# Patient Record
Sex: MALE | Race: WHITE | Employment: FULL TIME | URBAN - METROPOLITAN AREA
[De-identification: names, ages, dates, MRNs, and addresses within clinical notes are randomized per-mention and may not be internally consistent; named-entity substitution may affect disease eponyms.]

---

## 2020-09-10 ENCOUNTER — HOSPITAL ENCOUNTER (EMERGENCY)
Age: 45
Discharge: HOME | End: 2020-09-10
Payer: COMMERCIAL

## 2020-09-10 VITALS
HEIGHT: 65 IN | BODY MASS INDEX: 19.99 KG/M2 | DIASTOLIC BLOOD PRESSURE: 88 MMHG | HEART RATE: 89 BPM | WEIGHT: 120 LBS | TEMPERATURE: 97.9 F | RESPIRATION RATE: 16 BRPM | OXYGEN SATURATION: 98 % | SYSTOLIC BLOOD PRESSURE: 116 MMHG

## 2020-09-10 PROCEDURE — 2500000003 HC RX 250 WO HCPCS: Performed by: NURSE PRACTITIONER

## 2020-09-10 PROCEDURE — 6360000002 HC RX W HCPCS: Performed by: NURSE PRACTITIONER

## 2020-09-10 PROCEDURE — 6370000000 HC RX 637 (ALT 250 FOR IP): Performed by: NURSE PRACTITIONER

## 2020-09-10 PROCEDURE — 99283 EMERGENCY DEPT VISIT LOW MDM: CPT

## 2020-09-10 PROCEDURE — 99284 EMERGENCY DEPT VISIT MOD MDM: CPT

## 2020-09-10 PROCEDURE — 90471 IMMUNIZATION ADMIN: CPT | Performed by: NURSE PRACTITIONER

## 2020-09-10 PROCEDURE — 12002 RPR S/N/AX/GEN/TRNK2.6-7.5CM: CPT

## 2020-09-10 PROCEDURE — 90715 TDAP VACCINE 7 YRS/> IM: CPT | Performed by: NURSE PRACTITIONER

## 2020-09-10 RX ORDER — AMOXICILLIN AND CLAVULANATE POTASSIUM 875; 125 MG/1; MG/1
1 TABLET, FILM COATED ORAL ONCE
Status: COMPLETED | OUTPATIENT
Start: 2020-09-10 | End: 2020-09-10

## 2020-09-10 RX ORDER — AMOXICILLIN AND CLAVULANATE POTASSIUM 875; 125 MG/1; MG/1
1 TABLET, FILM COATED ORAL 2 TIMES DAILY
Qty: 14 TABLET | Refills: 0 | Status: SHIPPED | OUTPATIENT
Start: 2020-09-10 | End: 2020-09-17

## 2020-09-10 RX ORDER — LIDOCAINE HYDROCHLORIDE 10 MG/ML
20 INJECTION, SOLUTION INFILTRATION; PERINEURAL ONCE
Status: COMPLETED | OUTPATIENT
Start: 2020-09-10 | End: 2020-09-10

## 2020-09-10 RX ORDER — DIAPER,BRIEF,INFANT-TODD,DISP
EACH MISCELLANEOUS ONCE
Status: COMPLETED | OUTPATIENT
Start: 2020-09-10 | End: 2020-09-10

## 2020-09-10 RX ADMIN — LIDOCAINE HYDROCHLORIDE 20 ML: 10 INJECTION, SOLUTION INFILTRATION; PERINEURAL at 22:14

## 2020-09-10 RX ADMIN — AMOXICILLIN AND CLAVULANATE POTASSIUM 1 TABLET: 875; 125 TABLET, FILM COATED ORAL at 22:14

## 2020-09-10 RX ADMIN — TETANUS TOXOID, REDUCED DIPHTHERIA TOXOID AND ACELLULAR PERTUSSIS VACCINE, ADSORBED 0.5 ML: 5; 2.5; 8; 8; 2.5 SUSPENSION INTRAMUSCULAR at 22:14

## 2020-09-10 RX ADMIN — BACITRACIN: 500 OINTMENT TOPICAL at 22:14

## 2020-09-10 ASSESSMENT — PAIN SCALES - GENERAL: PAINLEVEL_OUTOF10: 8

## 2020-09-11 NOTE — ED PROVIDER NOTES
[facility]     Department of Emergency Medicine   ED  Provider Note  Admit Date/RoomTime: 9/10/2020  9:56 PM  ED Room: /  Chief Complaint:   Animal Bite (pt was bit by his dog on his left arm )    History of Present Illness   Source of history provided by:  patient. History/Exam Limitations: none. Chris Ramirez is a 40 y.o. old male presenting to the emergency department by private vehicle, for a dog bite to left forearm, which occured 1 hour(s) prior to arrival.  Since onset the symptoms have been persistent. Tetanus Status:  Unknown. He states that he was bit by his dog at home. Symptoms:    [x]   Pain     []   Redness     []   Pruritis     []   Rash     []   Abscess     []   Swelling     []   Abrasion     []   Puncture     [x]   Laceration              Abnormal Behavior Witnessed:  No.           Geographic Location Where Bitten:  N/A. Immunization Status of Animal:  unknown. ROS    Pertinent positives and negatives are stated within HPI, all other systems reviewed and are negative. Past Medical History:  has no past medical history on file. Past Surgical History:  has no past surgical history on file. Social History:  reports that he has been smoking cigarettes. He has been smoking about 1.00 pack per day. He does not have any smokeless tobacco history on file. He reports that he does not use drugs. Family History: family history is not on file. Allergies: Patient has no known allergies. Physical Exam           ED Triage Vitals [09/10/20 2154]   BP Temp Temp Source Pulse Resp SpO2 Height Weight   116/88 97.9 °F (36.6 °C) Tympanic 89 16 98 % 5' 5\" (1.651 m) 120 lb (54.4 kg)      Oxygen Saturation Interpretation: Normal.    Constitutional:  Alert, development consistent with age. Neck:  Normal ROM. Supple. Extremity(s):  Left: forearm. Tenderness:  mild. Swelling: None. Deformity: No.               ROM: full range of motion. foreign body or tendon injury seen. Debridement: None. Undermining: None. Wound Margins Revised: None. Flaps Aligned: no. The wound was closed loosely with 4-0 Ethilon using interrupted sutures. Dressing:  bacitracin and a sterile dressing was placed. Total number suture:  5. There were no additional lacerations requiring repair. MDM:   Patient presented with a dog bite to his left forearm and abrasion to his right elbow. His wound was thoroughly cleaned, sutured, and dressed. He is initiated on Augmentin and given a tetanus shot. He is appropriate for discharge and outpatient follow-up. He is instructed to return to the emergency department any new or worsening symptoms. Counseling: The emergency provider has spoken with the patient and discussed todays results, in addition to providing specific details for the plan of care and counseling regarding the diagnosis and prognosis. Questions are answered at this time and they are agreeable with the plan. Assessment      1. Dog bite, initial encounter      Plan   Discharge to home  Patient condition is good    New Medications     Discharge Medication List as of 9/10/2020 10:30 PM      START taking these medications    Details   amoxicillin-clavulanate (AUGMENTIN) 875-125 MG per tablet Take 1 tablet by mouth 2 times daily for 7 days, Disp-14 tablet,R-0Print           Electronically signed by NIKKI Meier CNP   DD: 9/10/20  **This report was transcribed using voice recognition software. Every effort was made to ensure accuracy; however, inadvertent computerized transcription errors may be present.   END OF ED PROVIDER NOTE     NIKKI Almodovar CNP  09/10/20 7612

## 2020-09-12 ENCOUNTER — HOSPITAL ENCOUNTER (OUTPATIENT)
Age: 45
Discharge: HOME | End: 2020-09-12
Payer: COMMERCIAL

## 2020-09-12 LAB
ALBUMIN SERPL-MCNC: 4.6 G/DL (ref 3.5–5.2)
ALP BLD-CCNC: 41 U/L (ref 40–129)
ALT SERPL-CCNC: 13 U/L (ref 0–40)
ANION GAP SERPL CALCULATED.3IONS-SCNC: 9 MMOL/L (ref 7–16)
AST SERPL-CCNC: 20 U/L (ref 0–39)
BASOPHILS ABSOLUTE: 0.02 E9/L (ref 0–0.2)
BASOPHILS RELATIVE PERCENT: 0.3 % (ref 0–2)
BILIRUB SERPL-MCNC: 0.8 MG/DL (ref 0–1.2)
BUN BLDV-MCNC: 9 MG/DL (ref 6–20)
CALCIUM SERPL-MCNC: 9.7 MG/DL (ref 8.6–10.2)
CHLORIDE BLD-SCNC: 102 MMOL/L (ref 98–107)
CHOLESTEROL, FASTING: 153 MG/DL (ref 0–199)
CO2: 30 MMOL/L (ref 22–29)
CREAT SERPL-MCNC: 0.9 MG/DL (ref 0.7–1.2)
EOSINOPHILS ABSOLUTE: 0.28 E9/L (ref 0.05–0.5)
EOSINOPHILS RELATIVE PERCENT: 3.9 % (ref 0–6)
GFR AFRICAN AMERICAN: >60
GFR NON-AFRICAN AMERICAN: >60 ML/MIN/1.73
GLUCOSE BLD-MCNC: 84 MG/DL (ref 74–99)
HCT VFR BLD CALC: 49.6 % (ref 37–54)
HDLC SERPL-MCNC: 62 MG/DL
HEMOGLOBIN: 17.2 G/DL (ref 12.5–16.5)
IMMATURE GRANULOCYTES #: 0.04 E9/L
IMMATURE GRANULOCYTES %: 0.6 % (ref 0–5)
LDL CHOLESTEROL CALCULATED: 68 MG/DL (ref 0–99)
LYMPHOCYTES ABSOLUTE: 1.56 E9/L (ref 1.5–4)
LYMPHOCYTES RELATIVE PERCENT: 21.6 % (ref 20–42)
MCH RBC QN AUTO: 33.6 PG (ref 26–35)
MCHC RBC AUTO-ENTMCNC: 34.7 % (ref 32–34.5)
MCV RBC AUTO: 96.9 FL (ref 80–99.9)
MONOCYTES ABSOLUTE: 0.73 E9/L (ref 0.1–0.95)
MONOCYTES RELATIVE PERCENT: 10.1 % (ref 2–12)
NEUTROPHILS ABSOLUTE: 4.6 E9/L (ref 1.8–7.3)
NEUTROPHILS RELATIVE PERCENT: 63.5 % (ref 43–80)
PDW BLD-RTO: 12.7 FL (ref 11.5–15)
PLATELET # BLD: 214 E9/L (ref 130–450)
PMV BLD AUTO: 11.1 FL (ref 7–12)
POTASSIUM SERPL-SCNC: 4.6 MMOL/L (ref 3.5–5)
PROSTATE SPECIFIC ANTIGEN: 1.83 NG/ML (ref 0–4)
RBC # BLD: 5.12 E12/L (ref 3.8–5.8)
SODIUM BLD-SCNC: 141 MMOL/L (ref 132–146)
TOTAL PROTEIN: 7.6 G/DL (ref 6.4–8.3)
TRIGLYCERIDE, FASTING: 116 MG/DL (ref 0–149)
VLDLC SERPL CALC-MCNC: 23 MG/DL
WBC # BLD: 7.2 E9/L (ref 4.5–11.5)

## 2020-09-12 PROCEDURE — 36415 COLL VENOUS BLD VENIPUNCTURE: CPT

## 2020-09-12 PROCEDURE — 85025 COMPLETE CBC W/AUTO DIFF WBC: CPT

## 2020-09-12 PROCEDURE — 80061 LIPID PANEL: CPT

## 2020-09-12 PROCEDURE — 80053 COMPREHEN METABOLIC PANEL: CPT

## 2020-09-12 PROCEDURE — G0103 PSA SCREENING: HCPCS

## 2022-09-16 ENCOUNTER — APPOINTMENT (OUTPATIENT)
Dept: GENERAL RADIOLOGY | Age: 47
DRG: 313 | End: 2022-09-16
Payer: COMMERCIAL

## 2022-09-16 ENCOUNTER — HOSPITAL ENCOUNTER (INPATIENT)
Age: 47
LOS: 1 days | Discharge: HOME | DRG: 313 | End: 2022-09-18
Attending: EMERGENCY MEDICINE | Admitting: INTERNAL MEDICINE
Payer: COMMERCIAL

## 2022-09-16 DIAGNOSIS — I21.4 NON-STEMI (NON-ST ELEVATED MYOCARDIAL INFARCTION) (HCC): Primary | ICD-10-CM

## 2022-09-16 LAB
ALBUMIN SERPL-MCNC: 4.3 G/DL (ref 3.5–5.2)
ALP BLD-CCNC: 42 U/L (ref 40–129)
ALT SERPL-CCNC: 19 U/L (ref 0–40)
ANION GAP SERPL CALCULATED.3IONS-SCNC: 10 MMOL/L (ref 7–16)
AST SERPL-CCNC: 22 U/L (ref 0–39)
BASOPHILS ABSOLUTE: 0.03 E9/L (ref 0–0.2)
BASOPHILS RELATIVE PERCENT: 0.5 % (ref 0–2)
BILIRUB SERPL-MCNC: 0.2 MG/DL (ref 0–1.2)
BUN BLDV-MCNC: 13 MG/DL (ref 6–20)
CALCIUM SERPL-MCNC: 9.4 MG/DL (ref 8.6–10.2)
CHLORIDE BLD-SCNC: 101 MMOL/L (ref 98–107)
CO2: 27 MMOL/L (ref 22–29)
CREAT SERPL-MCNC: 0.9 MG/DL (ref 0.7–1.2)
EOSINOPHILS ABSOLUTE: 0.18 E9/L (ref 0.05–0.5)
EOSINOPHILS RELATIVE PERCENT: 3.3 % (ref 0–6)
GFR AFRICAN AMERICAN: >60
GFR NON-AFRICAN AMERICAN: >60 ML/MIN/1.73
GLUCOSE BLD-MCNC: 136 MG/DL (ref 74–99)
HCT VFR BLD CALC: 42.9 % (ref 37–54)
HEMOGLOBIN: 14.7 G/DL (ref 12.5–16.5)
IMMATURE GRANULOCYTES #: 0.02 E9/L
IMMATURE GRANULOCYTES %: 0.4 % (ref 0–5)
LYMPHOCYTES ABSOLUTE: 1.7 E9/L (ref 1.5–4)
LYMPHOCYTES RELATIVE PERCENT: 30.7 % (ref 20–42)
MCH RBC QN AUTO: 32.4 PG (ref 26–35)
MCHC RBC AUTO-ENTMCNC: 34.3 % (ref 32–34.5)
MCV RBC AUTO: 94.5 FL (ref 80–99.9)
MONOCYTES ABSOLUTE: 0.77 E9/L (ref 0.1–0.95)
MONOCYTES RELATIVE PERCENT: 13.9 % (ref 2–12)
NEUTROPHILS ABSOLUTE: 2.83 E9/L (ref 1.8–7.3)
NEUTROPHILS RELATIVE PERCENT: 51.2 % (ref 43–80)
PDW BLD-RTO: 12.7 FL (ref 11.5–15)
PLATELET # BLD: 210 E9/L (ref 130–450)
PMV BLD AUTO: 11.3 FL (ref 7–12)
POTASSIUM SERPL-SCNC: 4.4 MMOL/L (ref 3.5–5)
RBC # BLD: 4.54 E12/L (ref 3.8–5.8)
SODIUM BLD-SCNC: 138 MMOL/L (ref 132–146)
TOTAL PROTEIN: 6.9 G/DL (ref 6.4–8.3)
TROPONIN, HIGH SENSITIVITY: 122 NG/L (ref 0–11)
WBC # BLD: 5.5 E9/L (ref 4.5–11.5)

## 2022-09-16 PROCEDURE — 80053 COMPREHEN METABOLIC PANEL: CPT

## 2022-09-16 PROCEDURE — 6370000000 HC RX 637 (ALT 250 FOR IP): Performed by: NURSE PRACTITIONER

## 2022-09-16 PROCEDURE — 84484 ASSAY OF TROPONIN QUANT: CPT

## 2022-09-16 PROCEDURE — 80307 DRUG TEST PRSMV CHEM ANLYZR: CPT

## 2022-09-16 PROCEDURE — 99285 EMERGENCY DEPT VISIT HI MDM: CPT

## 2022-09-16 PROCEDURE — 93005 ELECTROCARDIOGRAM TRACING: CPT | Performed by: NURSE PRACTITIONER

## 2022-09-16 PROCEDURE — 85025 COMPLETE CBC W/AUTO DIFF WBC: CPT

## 2022-09-16 PROCEDURE — 71045 X-RAY EXAM CHEST 1 VIEW: CPT

## 2022-09-16 RX ORDER — ASPIRIN 81 MG/1
81 TABLET, CHEWABLE ORAL DAILY
COMMUNITY

## 2022-09-16 RX ORDER — ASPIRIN 81 MG/1
243 TABLET, CHEWABLE ORAL ONCE
Status: COMPLETED | OUTPATIENT
Start: 2022-09-16 | End: 2022-09-16

## 2022-09-16 RX ORDER — CLOPIDOGREL BISULFATE 75 MG/1
75 TABLET ORAL DAILY
COMMUNITY

## 2022-09-16 RX ORDER — BUPROPION HYDROCHLORIDE 150 MG/1
150 TABLET ORAL EVERY MORNING
COMMUNITY

## 2022-09-16 RX ORDER — ATORVASTATIN CALCIUM 20 MG/1
20 TABLET, FILM COATED ORAL DAILY
Status: ON HOLD | COMMUNITY
End: 2022-09-18 | Stop reason: HOSPADM

## 2022-09-16 RX ADMIN — ASPIRIN 81 MG 243 MG: 81 TABLET ORAL at 21:14

## 2022-09-16 ASSESSMENT — PAIN - FUNCTIONAL ASSESSMENT: PAIN_FUNCTIONAL_ASSESSMENT: NONE - DENIES PAIN

## 2022-09-17 PROBLEM — I21.4 NSTEMI (NON-ST ELEVATED MYOCARDIAL INFARCTION) (HCC): Status: ACTIVE | Noted: 2022-09-17

## 2022-09-17 LAB
ALBUMIN SERPL-MCNC: 4.3 G/DL (ref 3.5–5.2)
ALBUMIN SERPL-MCNC: 4.5 G/DL (ref 3.5–5.2)
ALP BLD-CCNC: 37 U/L (ref 40–129)
ALP BLD-CCNC: 38 U/L (ref 40–129)
ALT SERPL-CCNC: 21 U/L (ref 0–40)
ALT SERPL-CCNC: 23 U/L (ref 0–40)
AMPHETAMINE SCREEN, URINE: NOT DETECTED
ANION GAP SERPL CALCULATED.3IONS-SCNC: 11 MMOL/L (ref 7–16)
ANION GAP SERPL CALCULATED.3IONS-SCNC: 8 MMOL/L (ref 7–16)
APTT: 29 SEC (ref 24.5–35.1)
APTT: 32.5 SEC (ref 24.5–35.1)
APTT: 32.5 SEC (ref 24.5–35.1)
AST SERPL-CCNC: 41 U/L (ref 0–39)
AST SERPL-CCNC: 48 U/L (ref 0–39)
BARBITURATE SCREEN URINE: NOT DETECTED
BASOPHILS ABSOLUTE: 0.01 E9/L (ref 0–0.2)
BASOPHILS RELATIVE PERCENT: 0.2 % (ref 0–2)
BENZODIAZEPINE SCREEN, URINE: NOT DETECTED
BILIRUB SERPL-MCNC: 0.5 MG/DL (ref 0–1.2)
BILIRUB SERPL-MCNC: 0.5 MG/DL (ref 0–1.2)
BILIRUBIN DIRECT: <0.2 MG/DL (ref 0–0.3)
BILIRUBIN, INDIRECT: ABNORMAL MG/DL (ref 0–1)
BUN BLDV-MCNC: 10 MG/DL (ref 6–20)
BUN BLDV-MCNC: 11 MG/DL (ref 6–20)
CALCIUM SERPL-MCNC: 9 MG/DL (ref 8.6–10.2)
CALCIUM SERPL-MCNC: 9.1 MG/DL (ref 8.6–10.2)
CANNABINOID SCREEN URINE: NOT DETECTED
CHLORIDE BLD-SCNC: 102 MMOL/L (ref 98–107)
CHLORIDE BLD-SCNC: 104 MMOL/L (ref 98–107)
CHOLESTEROL, TOTAL: 179 MG/DL (ref 0–199)
CO2: 24 MMOL/L (ref 22–29)
CO2: 26 MMOL/L (ref 22–29)
COCAINE METABOLITE SCREEN URINE: NOT DETECTED
CREAT SERPL-MCNC: 0.8 MG/DL (ref 0.7–1.2)
CREAT SERPL-MCNC: 0.8 MG/DL (ref 0.7–1.2)
EOSINOPHILS ABSOLUTE: 0.14 E9/L (ref 0.05–0.5)
EOSINOPHILS RELATIVE PERCENT: 2.5 % (ref 0–6)
FENTANYL SCREEN, URINE: NOT DETECTED
GFR AFRICAN AMERICAN: >60
GFR AFRICAN AMERICAN: >60
GFR NON-AFRICAN AMERICAN: >60 ML/MIN/1.73
GFR NON-AFRICAN AMERICAN: >60 ML/MIN/1.73
GLUCOSE BLD-MCNC: 105 MG/DL (ref 74–99)
GLUCOSE BLD-MCNC: 108 MG/DL (ref 74–99)
HBA1C MFR BLD: 4.9 % (ref 4–5.6)
HCT VFR BLD CALC: 43.3 % (ref 37–54)
HCT VFR BLD CALC: 44 % (ref 37–54)
HDLC SERPL-MCNC: 55 MG/DL
HEMOGLOBIN: 14.8 G/DL (ref 12.5–16.5)
HEMOGLOBIN: 15.3 G/DL (ref 12.5–16.5)
IMMATURE GRANULOCYTES #: 0.01 E9/L
IMMATURE GRANULOCYTES %: 0.2 % (ref 0–5)
LDL CHOLESTEROL CALCULATED: 102 MG/DL (ref 0–99)
LYMPHOCYTES ABSOLUTE: 1.54 E9/L (ref 1.5–4)
LYMPHOCYTES RELATIVE PERCENT: 28 % (ref 20–42)
Lab: NORMAL
MCH RBC QN AUTO: 32.3 PG (ref 26–35)
MCH RBC QN AUTO: 32.3 PG (ref 26–35)
MCHC RBC AUTO-ENTMCNC: 34.2 % (ref 32–34.5)
MCHC RBC AUTO-ENTMCNC: 34.8 % (ref 32–34.5)
MCV RBC AUTO: 93 FL (ref 80–99.9)
MCV RBC AUTO: 94.5 FL (ref 80–99.9)
METHADONE SCREEN, URINE: NOT DETECTED
MONOCYTES ABSOLUTE: 0.72 E9/L (ref 0.1–0.95)
MONOCYTES RELATIVE PERCENT: 13.1 % (ref 2–12)
NEUTROPHILS ABSOLUTE: 3.08 E9/L (ref 1.8–7.3)
NEUTROPHILS RELATIVE PERCENT: 56 % (ref 43–80)
OPIATE SCREEN URINE: NOT DETECTED
OXYCODONE URINE: NOT DETECTED
PDW BLD-RTO: 12.6 FL (ref 11.5–15)
PDW BLD-RTO: 12.8 FL (ref 11.5–15)
PHENCYCLIDINE SCREEN URINE: NOT DETECTED
PLATELET # BLD: 220 E9/L (ref 130–450)
PLATELET # BLD: 221 E9/L (ref 130–450)
PMV BLD AUTO: 10.8 FL (ref 7–12)
PMV BLD AUTO: 11.7 FL (ref 7–12)
POTASSIUM REFLEX MAGNESIUM: 4.8 MMOL/L (ref 3.5–5)
POTASSIUM SERPL-SCNC: 4.4 MMOL/L (ref 3.5–5)
RBC # BLD: 4.58 E12/L (ref 3.8–5.8)
RBC # BLD: 4.73 E12/L (ref 3.8–5.8)
REASON FOR REJECTION: NORMAL
REJECTED TEST: NORMAL
SODIUM BLD-SCNC: 136 MMOL/L (ref 132–146)
SODIUM BLD-SCNC: 139 MMOL/L (ref 132–146)
TOTAL PROTEIN: 6.7 G/DL (ref 6.4–8.3)
TOTAL PROTEIN: 7 G/DL (ref 6.4–8.3)
TRIGL SERPL-MCNC: 110 MG/DL (ref 0–149)
TROPONIN, HIGH SENSITIVITY: 421 NG/L (ref 0–11)
VLDLC SERPL CALC-MCNC: 22 MG/DL
WBC # BLD: 5.4 E9/L (ref 4.5–11.5)
WBC # BLD: 5.5 E9/L (ref 4.5–11.5)

## 2022-09-17 PROCEDURE — 85730 THROMBOPLASTIN TIME PARTIAL: CPT

## 2022-09-17 PROCEDURE — 6370000000 HC RX 637 (ALT 250 FOR IP): Performed by: CLINICAL NURSE SPECIALIST

## 2022-09-17 PROCEDURE — 6360000002 HC RX W HCPCS: Performed by: EMERGENCY MEDICINE

## 2022-09-17 PROCEDURE — 93005 ELECTROCARDIOGRAM TRACING: CPT | Performed by: NURSE PRACTITIONER

## 2022-09-17 PROCEDURE — 6360000002 HC RX W HCPCS: Performed by: NURSE PRACTITIONER

## 2022-09-17 PROCEDURE — 2060000000 HC ICU INTERMEDIATE R&B

## 2022-09-17 PROCEDURE — 85027 COMPLETE CBC AUTOMATED: CPT

## 2022-09-17 PROCEDURE — 83036 HEMOGLOBIN GLYCOSYLATED A1C: CPT

## 2022-09-17 PROCEDURE — 6370000000 HC RX 637 (ALT 250 FOR IP): Performed by: INTERNAL MEDICINE

## 2022-09-17 PROCEDURE — 80048 BASIC METABOLIC PNL TOTAL CA: CPT

## 2022-09-17 PROCEDURE — 2580000003 HC RX 258: Performed by: NURSE PRACTITIONER

## 2022-09-17 PROCEDURE — 80061 LIPID PANEL: CPT

## 2022-09-17 PROCEDURE — 2580000003 HC RX 258: Performed by: INTERNAL MEDICINE

## 2022-09-17 PROCEDURE — 80076 HEPATIC FUNCTION PANEL: CPT

## 2022-09-17 PROCEDURE — APPSS60 APP SPLIT SHARED TIME 46-60 MINUTES: Performed by: CLINICAL NURSE SPECIALIST

## 2022-09-17 PROCEDURE — 80053 COMPREHEN METABOLIC PANEL: CPT

## 2022-09-17 PROCEDURE — 85025 COMPLETE CBC W/AUTO DIFF WBC: CPT

## 2022-09-17 PROCEDURE — 99254 IP/OBS CNSLTJ NEW/EST MOD 60: CPT | Performed by: INTERNAL MEDICINE

## 2022-09-17 PROCEDURE — 36415 COLL VENOUS BLD VENIPUNCTURE: CPT

## 2022-09-17 RX ORDER — NICOTINE 21 MG/24HR
1 PATCH, TRANSDERMAL 24 HOURS TRANSDERMAL DAILY
Status: DISCONTINUED | OUTPATIENT
Start: 2022-09-17 | End: 2022-09-19 | Stop reason: HOSPADM

## 2022-09-17 RX ORDER — ROSUVASTATIN CALCIUM 20 MG/1
20 TABLET, COATED ORAL NIGHTLY
Status: DISCONTINUED | OUTPATIENT
Start: 2022-09-17 | End: 2022-09-19 | Stop reason: HOSPADM

## 2022-09-17 RX ORDER — LORAZEPAM 1 MG/1
2 TABLET ORAL
Status: DISCONTINUED | OUTPATIENT
Start: 2022-09-17 | End: 2022-09-19 | Stop reason: HOSPADM

## 2022-09-17 RX ORDER — HEPARIN SODIUM 1000 [USP'U]/ML
30 INJECTION, SOLUTION INTRAVENOUS; SUBCUTANEOUS PRN
Status: DISCONTINUED | OUTPATIENT
Start: 2022-09-17 | End: 2022-09-18 | Stop reason: ALTCHOICE

## 2022-09-17 RX ORDER — ACETAMINOPHEN 650 MG/1
650 SUPPOSITORY RECTAL EVERY 6 HOURS PRN
Status: DISCONTINUED | OUTPATIENT
Start: 2022-09-17 | End: 2022-09-19 | Stop reason: HOSPADM

## 2022-09-17 RX ORDER — LORAZEPAM 2 MG/ML
2 INJECTION INTRAMUSCULAR
Status: DISCONTINUED | OUTPATIENT
Start: 2022-09-17 | End: 2022-09-19 | Stop reason: HOSPADM

## 2022-09-17 RX ORDER — SODIUM CHLORIDE 9 MG/ML
INJECTION, SOLUTION INTRAVENOUS PRN
Status: DISCONTINUED | OUTPATIENT
Start: 2022-09-17 | End: 2022-09-19 | Stop reason: HOSPADM

## 2022-09-17 RX ORDER — LORAZEPAM 1 MG/1
4 TABLET ORAL
Status: DISCONTINUED | OUTPATIENT
Start: 2022-09-17 | End: 2022-09-19 | Stop reason: HOSPADM

## 2022-09-17 RX ORDER — SODIUM CHLORIDE 0.9 % (FLUSH) 0.9 %
5-40 SYRINGE (ML) INJECTION EVERY 12 HOURS SCHEDULED
Status: DISCONTINUED | OUTPATIENT
Start: 2022-09-17 | End: 2022-09-19 | Stop reason: HOSPADM

## 2022-09-17 RX ORDER — ATORVASTATIN CALCIUM 20 MG/1
20 TABLET, FILM COATED ORAL DAILY
Status: DISCONTINUED | OUTPATIENT
Start: 2022-09-17 | End: 2022-09-17

## 2022-09-17 RX ORDER — ACETAMINOPHEN 325 MG/1
650 TABLET ORAL EVERY 6 HOURS PRN
Status: DISCONTINUED | OUTPATIENT
Start: 2022-09-17 | End: 2022-09-19 | Stop reason: HOSPADM

## 2022-09-17 RX ORDER — SODIUM CHLORIDE 0.9 % (FLUSH) 0.9 %
5-40 SYRINGE (ML) INJECTION PRN
Status: DISCONTINUED | OUTPATIENT
Start: 2022-09-17 | End: 2022-09-19 | Stop reason: HOSPADM

## 2022-09-17 RX ORDER — ASPIRIN 81 MG/1
81 TABLET, CHEWABLE ORAL DAILY
Status: DISCONTINUED | OUTPATIENT
Start: 2022-09-17 | End: 2022-09-19 | Stop reason: HOSPADM

## 2022-09-17 RX ORDER — HEPARIN SODIUM 1000 [USP'U]/ML
60 INJECTION, SOLUTION INTRAVENOUS; SUBCUTANEOUS PRN
Status: DISCONTINUED | OUTPATIENT
Start: 2022-09-17 | End: 2022-09-18 | Stop reason: ALTCHOICE

## 2022-09-17 RX ORDER — LORAZEPAM 2 MG/ML
1 INJECTION INTRAMUSCULAR
Status: DISCONTINUED | OUTPATIENT
Start: 2022-09-17 | End: 2022-09-19 | Stop reason: HOSPADM

## 2022-09-17 RX ORDER — SENNA PLUS 8.6 MG/1
1 TABLET ORAL DAILY PRN
Status: DISCONTINUED | OUTPATIENT
Start: 2022-09-17 | End: 2022-09-19 | Stop reason: HOSPADM

## 2022-09-17 RX ORDER — ONDANSETRON 4 MG/1
4 TABLET, ORALLY DISINTEGRATING ORAL EVERY 8 HOURS PRN
Status: DISCONTINUED | OUTPATIENT
Start: 2022-09-17 | End: 2022-09-19 | Stop reason: HOSPADM

## 2022-09-17 RX ORDER — LORAZEPAM 1 MG/1
3 TABLET ORAL
Status: DISCONTINUED | OUTPATIENT
Start: 2022-09-17 | End: 2022-09-19 | Stop reason: HOSPADM

## 2022-09-17 RX ORDER — ONDANSETRON 2 MG/ML
4 INJECTION INTRAMUSCULAR; INTRAVENOUS EVERY 6 HOURS PRN
Status: DISCONTINUED | OUTPATIENT
Start: 2022-09-17 | End: 2022-09-19 | Stop reason: HOSPADM

## 2022-09-17 RX ORDER — CLOPIDOGREL BISULFATE 75 MG/1
75 TABLET ORAL DAILY
Status: DISCONTINUED | OUTPATIENT
Start: 2022-09-17 | End: 2022-09-19 | Stop reason: HOSPADM

## 2022-09-17 RX ORDER — HEPARIN SODIUM 10000 [USP'U]/100ML
5-30 INJECTION, SOLUTION INTRAVENOUS CONTINUOUS
Status: DISCONTINUED | OUTPATIENT
Start: 2022-09-17 | End: 2022-09-18

## 2022-09-17 RX ORDER — HEPARIN SODIUM 1000 [USP'U]/ML
60 INJECTION, SOLUTION INTRAVENOUS; SUBCUTANEOUS ONCE
Status: COMPLETED | OUTPATIENT
Start: 2022-09-17 | End: 2022-09-17

## 2022-09-17 RX ORDER — LORAZEPAM 2 MG/ML
4 INJECTION INTRAMUSCULAR
Status: DISCONTINUED | OUTPATIENT
Start: 2022-09-17 | End: 2022-09-19 | Stop reason: HOSPADM

## 2022-09-17 RX ORDER — BUPROPION HYDROCHLORIDE 150 MG/1
150 TABLET ORAL EVERY MORNING
Status: DISCONTINUED | OUTPATIENT
Start: 2022-09-17 | End: 2022-09-19 | Stop reason: HOSPADM

## 2022-09-17 RX ORDER — LORAZEPAM 1 MG/1
1 TABLET ORAL
Status: DISCONTINUED | OUTPATIENT
Start: 2022-09-17 | End: 2022-09-19 | Stop reason: HOSPADM

## 2022-09-17 RX ORDER — LORAZEPAM 2 MG/ML
3 INJECTION INTRAMUSCULAR
Status: DISCONTINUED | OUTPATIENT
Start: 2022-09-17 | End: 2022-09-19 | Stop reason: HOSPADM

## 2022-09-17 RX ADMIN — Medication 10 ML: at 09:00

## 2022-09-17 RX ADMIN — Medication 12 UNITS/KG/HR: at 02:01

## 2022-09-17 RX ADMIN — HEPARIN SODIUM 3540 UNITS: 1000 INJECTION INTRAVENOUS; SUBCUTANEOUS at 19:09

## 2022-09-17 RX ADMIN — ROSUVASTATIN CALCIUM 20 MG: 20 TABLET, FILM COATED ORAL at 21:17

## 2022-09-17 RX ADMIN — SODIUM CHLORIDE, PRESERVATIVE FREE 10 ML: 5 INJECTION INTRAVENOUS at 21:17

## 2022-09-17 RX ADMIN — HEPARIN SODIUM 1770 UNITS: 1000 INJECTION INTRAVENOUS; SUBCUTANEOUS at 10:08

## 2022-09-17 RX ADMIN — HEPARIN SODIUM 3540 UNITS: 1000 INJECTION INTRAVENOUS; SUBCUTANEOUS at 01:55

## 2022-09-17 ASSESSMENT — LIFESTYLE VARIABLES
HOW MANY STANDARD DRINKS CONTAINING ALCOHOL DO YOU HAVE ON A TYPICAL DAY: 5 OR 6
HOW OFTEN DO YOU HAVE A DRINK CONTAINING ALCOHOL: 4 OR MORE TIMES A WEEK

## 2022-09-17 NOTE — CONSULTS
I independently interviewed and examined the patient. I have reviewed the above documentation completed by the OAN[facility]. Please see my additional contributions to the HPI, physical exam, and assessment / medical decision making. Reason for consult[de-identified] NSTEMI    Not previously known to [facility]) cardiology    Mr. Sameer Purdy is a 71-year-old  male with history of cocaine abuse which she quit in August 2022, alcohol abuse, marijuana use, chronic smoker with 34-pack-year history of smoking currently smoking half pack per day presented to hospital with chest pain. He was recently hospitalized at  from 8/3/2020 to 8/5/2022 with chest pain and positive troponin in setting of cocaine toxicity. Patient with underwent cardiac catheterization and was told no obstructive CAD. Patient was discharged home on aspirin Plavix and Lipitor. He was tried on beta-blocker which was subsequently discontinued due to bradycardia. Last night patient developed tightness in his throat with discomfort over bilateral infra axillary area that lasted about 30 to 35 minutes. He took Prevacid with improvement in his symptoms and pain ultimately resolved after coming to the hospital.  Patient remained pain-free after coming to the hospital.  Currently denies any chest pain, throat pain, dyspnea, palpitations, dizziness or lightheadedness. ER work-up showed troponin of 122 on 421. Creatinine was normal.  EKG normal sinus rhythm, LVH with T wave changes suggestive of inferior wall ischemia. Chest x-ray was normal.  Patient was given aspirin and was initiated on heparin. Urine drug screen was negative.   No family history of premature CAD    Review of Systems:  Cardiac: As per HPI  General: No fever, chills  Pulmonary: As per HPI  GI: No nausea, vomiting  Musculoskeletal: PETERSON x 4, no focal motor deficits  Skin: Intact, no rashes  Neuro/Psych: No headache or seizures    Physical Exam:  /74   Pulse 71   Temp 97.9 °F (36.6 °C) (Oral)   Resp 16   Ht 5' 6\" (1.676 m)   Wt 130 lb (59 kg)   SpO2 97%   BMI 20.98 kg/m²   Appearance: Awake, alert, no acute respiratory distress  Skin: Intact, no rash  Head: Normocephalic, atraumatic  ENMT: MMM, no rhinorrhea  Neck: Supple, no carotid bruits  Lungs: Clear to auscultation bilaterally. No wheezes, rales, or rhonchi. Cardiac: Regular rate and rhythm, +S1S2, no murmurs apparent  Abdomen: Soft, +bowel sounds  Extremities: Moves all extremities x 4, no lower extremity edema  Neurologic: No focal motor deficits apparent, normal mood and affect    Telemetry findings reviewed: SR at rate 70s    EKG: Normal sinus rhythm, LVH, T wave changes suggestive of inferior wall ischemia    Vitals and labs were reviewed: Blood pressure 116/74 heart rate 71 sats 94% on room air    Labs-BMP normal, troponin, high sensitivity 122>> 421, cholesterol 179, HDL 50, , triglyceride 110, AST/ALT 41/21, A1c 4.9, urine drug screen negative, CBC normal.    Chest x-ray NAD    Assessment:  Atypical chest pain with positive troponin and significant delta. Recent history of NSTEMI in the setting of cocaine toxicity, cardiac cath at [facility] in August 2022 was normal  History of polysubstance abuse quit in August 2022  Tobacco use disorder  History of alcohol abuse        Plan:   Continue aspirin, Plavix and IV heparin  Will change atorvastatin to rosuvastatin 20 mg p.o. daily  Obtain medical records including cardiac cath results from [facility]  Will obtain an echocardiogram to assess LV function  Add low-dose beta-blocker therapy with Toprol-XL 12.5 mg p.o. daily  Patient was advised again to stop smoking, drinking and also using any illicit drugs. No ischemic work-up is planned at this time and he can eat. Further recommendations pending above      Thank you for consulting allowing us to participate in Mr. Nelson's care. Jackie Faye MD, [address]   [facility]) Cardiology

## 2022-09-17 NOTE — ED NOTES
Department of Emergency Medicine  FIRST PROVIDER TRIAGE NOTE             [facility]dependent [facility]           9/16/22  9:01 PM EDT    Date of Encounter: 9/16/22   MRN: [id number]      HPI: Jessie Wang is a 52 y.o. male who presents to the ED for Dizziness (Lightheaded clammy and nauseated started one hour ago. Has calmed down a little. Hx of MI a month ago. )        ROS: Negative for back pain or fever. PE: Gen Appearance/Constitutional: alert  CV: regular rate     Initial Plan of Care: All treatment areas with department are currently occupied. Plan to order/Initiate the following while awaiting opening in ED: labs, EKG, and imaging studies.   Initiate Treatment-Testing, Proceed toTreatment Area When Bed Available for ED Attending/LIN to Continue Care    Electronically signed by NIKKI Glynn CNP   DD: 9/16/22       NIKKI Glynn CNP  09/16/22 2382    ATTENDING PROVIDER ATTESTATION:     Supervising Physician, on-site, available for consultation, non-participatory in the evaluation or care of this patient         Elliott Diaz MD  09/16/22 5916

## 2022-09-17 NOTE — ED PROVIDER NOTES
auscultation bilaterally, no wheezes, rales, or rhonchi. Not in respiratory distress  Cardiovascular:  Regular rate. Regular rhythm. No murmurs, gallops, or rubs. 2+ distal pulses  Chest: no chest wall tenderness  Abdomen: Soft. Non tender. Non distended. +BS. No rebound, guarding, or rigidity. No pulsatile masses appreciated. Musculoskeletal: Moves all extremities x 4. Warm and well perfused, no clubbing, cyanosis, or edema. Capillary refill <3 seconds  Skin: warm and dry. No rashes. Neurologic: GCS 15, CN 2-12 grossly intact, no focal deficits, symmetric strength 5/5 in the upper and lower extremities bilaterally  Psych: Normal Affect    -------------------------------------------------- RESULTS -------------------------------------------------  I have personally reviewed all laboratory and imaging results for this patient. Results are listed below.      LABS:  Results for orders placed or performed during the hospital encounter of 09/16/22   Troponin   Result Value Ref Range    Troponin, High Sensitivity 122 (H) 0 - 11 ng/L   CBC with Auto Differential   Result Value Ref Range    WBC 5.5 4.5 - 11.5 E9/L    RBC 4.54 3.80 - 5.80 E12/L    Hemoglobin 14.7 12.5 - 16.5 g/dL    Hematocrit 42.9 37.0 - 54.0 %    MCV 94.5 80.0 - 99.9 fL    MCH 32.4 26.0 - 35.0 pg    MCHC 34.3 32.0 - 34.5 %    RDW 12.7 11.5 - 15.0 fL    Platelets 661 258 - 566 E9/L    MPV 11.3 7.0 - 12.0 fL    Neutrophils % 51.2 43.0 - 80.0 %    Immature Granulocytes % 0.4 0.0 - 5.0 %    Lymphocytes % 30.7 20.0 - 42.0 %    Monocytes % 13.9 (H) 2.0 - 12.0 %    Eosinophils % 3.3 0.0 - 6.0 %    Basophils % 0.5 0.0 - 2.0 %    Neutrophils Absolute 2.83 1.80 - 7.30 E9/L    Immature Granulocytes # 0.02 E9/L    Lymphocytes Absolute 1.70 1.50 - 4.00 E9/L    Monocytes Absolute 0.77 0.10 - 0.95 E9/L    Eosinophils Absolute 0.18 0.05 - 0.50 E9/L    Basophils Absolute 0.03 0.00 - 0.20 E9/L   Comprehensive Metabolic Panel   Result Value Ref Range    Sodium 138 132 - administration in time range)   heparin (porcine) injection 1,770 Units (has no administration in time range)   heparin 25,000 units in dextrose 5% 250 mL (premix) infusion (has no administration in time range)   aspirin chewable tablet 243 mg (243 mg Oral Given 9/16/22 2114)             Medical Decision Making:   Patient presenting here because of history of tightness in throat as well as being diaphoretic. Patient reports it started short time ago. Patient reporting symptoms have improved. He states that he took acid reflux medication and improving. Patient reports he had a heart attack in August.  I looked at the old chart and patient had a type II MI due to vasospasm from substance abuse. Patient heart cath report unable to be seen. Patient reports he had a heart catheterization in [place]. Patient reports he was not stented at that time. Patient troponins were noted and they were over 39,000 at outlying facility. Patient labs and EKG noted reviewed EKG is sinus rhythm there appears to be Q waves inferiorly there is no reciprocal changes on EKG. Patient having no active pain here in the emergency department repeat troponins were done and noted. Patient was started on heparin. Patient was checked on multiple times family at bedside he is having no pain at all here in the emergency department he was made aware of findings and plan patient will be admitted for further evaluation treatment. Re-Evaluations:  Patient reevaluated and checked on multiple times here Emergency Department. Patient having no active chest pain or tightness in his throat. Patient was made aware of findings and plan. [facility] Patient denies he is not using any drugs at this time. Patient was started on heparin. Patient will be admitted for further evaluation and treatment.                    Consultations:             Internal medicine and call was placed to cardiology. Critical Care:     Please note that the withdrawal or failure to initiate urgent interventions for this patient would likely result in a life threatening deterioration or permanent disability. Accordingly this patient received 30 minutes of critical care time, excluding separately billable procedures. This patient's ED course included: a personal history and physicial eaxmination    This patient has been closely monitored during their ED course. Counseling: The emergency provider has spoken with the patient and discussed todays results, in addition to providing specific details for the plan of care and counseling regarding the diagnosis and prognosis. Questions are answered at this time and they are agreeable with the plan.       --------------------------------- IMPRESSION AND DISPOSITION ---------------------------------    IMPRESSION  1. Non-STEMI (non-ST elevated myocardial infarction) ([facility] [address]        DISPOSITION  Disposition: Admit to monitored bed  Patient condition is stable        NOTE: This report was transcribed using voice recognition software.  Every effort was made to ensure accuracy; however, inadvertent computerized transcription errors may be present          Raul Barnes MD  09/17/22 0120

## 2022-09-17 NOTE — ED NOTES
Report called to [place] RN, [facility] faxed, Pt marked ready for transport.  Dede TOM made aware of 1600 aPTT lab draw being completed and we are awaiting results for Heparin adjustment     Josh Romero RN  09/17/22 1037

## 2022-09-17 NOTE — CONSULTS
Inpatient Cardiology Consultation      Reason for Consult:  Chest pain, elevated troponin     Consulting Physician: Dr. Miladis Ward    Requesting Physician:  Dr Rafael Padilla    Date of Consultation: 9/17/2022    History of Present Illness:   Mr. Anuja Georges is a 52year old gentleman who is previously unknown to our practice. He was recently admitted at [facility] for type II MI, which was felt to be secondary to vasospasm from cocaine use. He was discharged from there on August 5, 2022 and prescribed aspirin, Plavix, and Lipitor. Full records from that admission are not available for review, but have been requested. He presented to our emergency room last evening due to throat tightness and diaphoresis after eating chili earlier that evening. He describes nausea and burning discomfort in his throat and upper chest, and he took a Prevacid after about two hours. His pain gradually resolved and he was pain free on arrival to the ED. These symptoms were different than what he had with his recent MI, where he had diffuse tightness across his chest. On arrival here, blood pressure was 116/73, and serial troponin levels are 122 >>421. He has been started on Heparin drip and also received 243 mg of aspirin. At this time, he is resting in bed and in no apparent distress. He reports compliance with DAPT, but stopped taking Lipitor about two weeks ago because he would often have indigestion and \"heartburn\" when taking it. He has not used cocaine since his MI, and is now smoking about 1/2 PPD of cigarettes and drinks four to five beers daily (which is less than he had been). 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 Past Medical History:    NSTEMI: type II due to coronary vasospasm 8/03/2022 (treated at [facility]). Catheterization report not available for review.  Troponin levels: 03287.97 ng/L. 04157.05 ng/L. >55860.00 ng/L   Lipid panel 8/04/2022: cholesterol 167, HDL 55, LDL 93, triglycerides 96  Sinus bradycardia: beta blocker therapy was deferred on recent admission in [facility]. Cocaine use  Tobacco use disorder  Alcohol use disorder       Medications Prior to Admit:  Prior to Admission medications    Medication Sig Start Date End Date Taking? Authorizing Provider   aspirin 81 MG chewable tablet Take 81 mg by mouth daily   Yes Historical Provider, MD   clopidogrel (PLAVIX) 75 MG tablet Take 75 mg by mouth daily   Yes Historical Provider, MD   atorvastatin (LIPITOR) 20 MG tablet Take 20 mg by mouth daily   Yes Historical Provider, MD   buPROPion (WELLBUTRIN XL) 150 MG extended release tablet Take 150 mg by mouth every morning   Yes Historical Provider, MD   ibuprofen (ADVIL;MOTRIN) 800 MG tablet Take 1 tablet by mouth every 6 hours as needed for Pain for up to 5 days. 12/9/14 12/14/14  Mau Ramírez MD   traMADol (ULTRAM) 50 MG tablet Take 1 tablet by mouth every 6 hours as needed for Pain for up to 10 doses.  12/9/14   Mau Ramírez MD       Current Medications:    Current Facility-Administered Medications: heparin (porcine) injection 3,540 Units, 60 Units/kg, IntraVENous, PRN  heparin (porcine) injection 1,770 Units, 30 Units/kg, IntraVENous, PRN  heparin 25,000 units in dextrose 5% 250 mL (premix) infusion, 5-30 Units/kg/hr, IntraVENous, Continuous  aspirin chewable tablet 81 mg, 81 mg, Oral, Daily  atorvastatin (LIPITOR) tablet 20 mg, 20 mg, Oral, Daily  buPROPion (WELLBUTRIN XL) extended release tablet 150 mg, 150 mg, Oral, QAM  clopidogrel (PLAVIX) tablet 75 mg, 75 mg, Oral, Daily  sodium chloride flush 0.9 % injection 5-40 mL, 5-40 mL, IntraVENous, 2 times per day  sodium chloride flush 0.9 % injection 5-40 mL, 5-40 mL, IntraVENous, PRN  0.9 % sodium chloride infusion, , IntraVENous, PRN  ondansetron (ZOFRAN-ODT) disintegrating tablet 4 mg, 4 mg, Oral, Q8H PRN **OR** ondansetron (ZOFRAN) injection 4 mg, 4 mg, IntraVENous, Q6H PRN  acetaminophen (TYLENOL) tablet 650 mg, 650 mg, Oral, Q6H PRN **OR** acetaminophen (TYLENOL) suppository 650 mg, 650 mg, Rectal, Q6H PRN  senna (SENOKOT) tablet 8.6 mg, 1 tablet, Oral, Daily PRN    Allergies:  Patient has no known allergies. Social History:    He has not used cocaine since his August admission, prior to that he \"used a lot\". He used to smoke marijuana but no longer does. He is smoking about 1/2 PPD today, but used to smoke 1 PPD and has been a smoker since age 15  He used to drink \"one beer after another\" but has cut down to four to five daily since his MI    Family History  He is adopted. He has a twin brother who  two years ago from non small cell lung CA (it was after this he began to use substances)     Review of Systems:   Negative except as noted above    Physical Exam:   BP Range Last 24 hours:   Heart Rate Range Last 24 hours:    /79   Pulse 90   Temp 97.5 °F (36.4 °C) (Oral)   Resp 16   Ht 5' 6\" (1.676 m)   Wt 130 lb (59 kg)   SpO2 98%   BMI 20.98 kg/m²   CONST:  Well developed, well nourished middle aged gentleman who appears of stated age. Awake, alert and cooperative. No apparent distress. HEENT:   Head- Normocephalic, atraumatic   Eyes- Conjunctivae pink  Throat- Oral mucosa pink and moist  Neck-  No stridor, jugular venous distention or carotid bruit. CHEST: Chest symmetrical and non-tender to palpation. Respirations unlabored. RESPIRATORY: Breath sounds clear throughout   CARDIOVASCULAR:     Heart Ausculation- Regular rate and rhythm, no murmur, s3, s4 or rub   PV: No lower extremity edema. No varicosities. Pedal pulses palpable  ABDOMEN: Soft, non-tender to light palpation. Bowel sounds present. No palpable masses   MS: Good muscle strength and tone. No atrophy or abnormal movements. : Deferred  SKIN: Warm and dry   NEURO / PSYCH: Oriented to person, place and time. Speech clear and appropriate. Follows all commands.  Pleasant affect     No intake or output data in the 24 hours ending 09/17/22 0759    Labs:   CBC:   Recent Labs     09/17/22 0142 09/17/22  0555   WBC 5.4 5.5   HGB 15.3 14.8   HCT 44.0 43.3    220     BMP:   Recent Labs     09/16/22 2110 09/17/22  0555    139   K 4.4 4.8   CO2 27 24   BUN 13 11   CREATININE 0.9 0.8   LABGLOM >60 >60   CALCIUM 9.4 9.1       HgA1c:   Lab Results   Component Value Date    LABA1C 4.9 09/17/2022     APTT:  Recent Labs     09/17/22 0142   APTT 29.0     CARDIAC ENZYMES:  Recent Labs     09/16/22 2110 09/16/22  2353   TROPHS 122* 421*      FASTING LIPID PANEL:  Lab Results   Component Value Date/Time    CHOL 179 09/17/2022 05:55 AM    HDL 55 09/17/2022 05:55 AM    LDLCALC 102 09/17/2022 05:55 AM    TRIG 110 09/17/2022 05:55 AM     LIVER PROFILE:  Recent Labs     09/16/22 2110 09/17/22  0555   AST 22 48*   ALT 19 23   LABALBU 4.3 4.5       CXR 9/16/2022:  FINDINGS:   Single AP upright portable chest demonstrate satisfactory expansion lungs which are clear. The cardiac silhouette appears unremarkable. There is no evidence of a pneumothorax. The soft tissues and osseous structures appear   normal.     Impression:     No acute cardiopulmonary process. Assessment and Treatment Plan to follow by Dr. Shayla Stewart. Electronically signed by NIKKI Santos on 9/17/2022 at 7:59 AM      I independently interviewed and examined the patient. I have reviewed the above documentation completed by the [facility]. Please see my additional contributions to the HPI, physical exam, and assessment / medical decision making. Reason for consult[de-identified] NSTEMI     Not previously known to [facility]) cardiology     Mr. Emily Maxwell is a 59-year-old  male with history of cocaine abuse which she quit in August 2022, alcohol abuse, marijuana use, chronic smoker with 34-pack-year history of smoking currently smoking half pack per day presented to hospital with chest pain.   He was recently hospitalized at [facility] from 8/3/2020 to 8/5/2022 with chest pain and positive troponin in setting of cocaine toxicity. Patient with underwent cardiac catheterization and was told no obstructive CAD. Patient was discharged home on aspirin Plavix and Lipitor. He was tried on beta-blocker which was subsequently discontinued due to bradycardia. Last night patient developed tightness in his throat with discomfort over bilateral infra axillary area that lasted about 30 to 35 minutes. He took Prevacid with improvement in his symptoms and pain ultimately resolved after coming to the hospital.  Patient remained pain-free after coming to the hospital.  Currently denies any chest pain, throat pain, dyspnea, palpitations, dizziness or lightheadedness. ER work-up showed troponin of 122 on 421. Creatinine was normal.  EKG normal sinus rhythm, LVH with T wave changes suggestive of inferior wall ischemia. Chest x-ray was normal.  Patient was given aspirin and was initiated on heparin. Urine drug screen was negative. No family history of premature CAD     Review of Systems:  Cardiac: As per HPI  General: No fever, chills  Pulmonary: As per HPI  GI: No nausea, vomiting  Musculoskeletal: PETERSON x 4, no focal motor deficits  Skin: Intact, no rashes  Neuro/Psych: No headache or seizures     Physical Exam:  /74   Pulse 71   Temp 97.9 °F (36.6 °C) (Oral)   Resp 16   Ht 5' 6\" (1.676 m)   Wt 130 lb (59 kg)   SpO2 97%   BMI 20.98 kg/m²   Appearance: Awake, alert, no acute respiratory distress  Skin: Intact, no rash  Head: Normocephalic, atraumatic  ENMT: MMM, no rhinorrhea  Neck: Supple, no carotid bruits  Lungs: Clear to auscultation bilaterally. No wheezes, rales, or rhonchi.   Cardiac: Regular rate and rhythm, +S1S2, no murmurs apparent  Abdomen: Soft, +bowel sounds  Extremities: Moves all extremities x 4, no lower extremity edema  Neurologic: No focal motor deficits apparent, normal mood and affect     Telemetry findings reviewed: SR at rate 70s     EKG: Normal sinus rhythm, LVH, T wave changes suggestive of inferior wall ischemia     Vitals and labs were reviewed: Blood pressure 116/74 heart rate 71 sats 94% on room air     Labs-BMP normal, troponin, high sensitivity 122>> 421, cholesterol 179, HDL 50, , triglyceride 110, AST/ALT 41/21, A1c 4.9, urine drug screen negative, CBC normal.     Chest x-ray NAD     Assessment:  Atypical chest pain with positive troponin and significant delta. Recent history of NSTEMI in the setting of cocaine toxicity, cardiac cath at [facility] in August 2022 was normal  History of polysubstance abuse quit in August 2022  Tobacco use disorder  History of alcohol abuse           Plan:   Continue aspirin, Plavix and IV heparin  Will change atorvastatin to rosuvastatin 20 mg p.o. daily  Obtain medical records including cardiac cath results from [facility]  Will obtain an echocardiogram to assess LV function  Add low-dose beta-blocker therapy with Toprol-XL 12.5 mg p.o. daily  Patient was advised again to stop smoking, drinking and also using any illicit drugs. No ischemic work-up is planned at this time and he can eat. Further recommendations pending above        Thank you for consulting allowing us to participate in Mr. Nelson's care. Padmini Spencer MD, Stephen Neely   [facility]) Cardiology

## 2022-09-18 VITALS
OXYGEN SATURATION: 97 % | HEIGHT: 66 IN | WEIGHT: 130 LBS | SYSTOLIC BLOOD PRESSURE: 115 MMHG | DIASTOLIC BLOOD PRESSURE: 88 MMHG | HEART RATE: 59 BPM | BODY MASS INDEX: 20.89 KG/M2 | RESPIRATION RATE: 16 BRPM | TEMPERATURE: 98.8 F

## 2022-09-18 LAB
ALBUMIN SERPL-MCNC: 3.6 G/DL (ref 3.5–5.2)
ALP BLD-CCNC: 38 U/L (ref 40–129)
ALT SERPL-CCNC: 17 U/L (ref 0–40)
ANION GAP SERPL CALCULATED.3IONS-SCNC: 10 MMOL/L (ref 7–16)
APTT: 50.2 SEC (ref 24.5–35.1)
APTT: 50.7 SEC (ref 24.5–35.1)
AST SERPL-CCNC: 24 U/L (ref 0–39)
BILIRUB SERPL-MCNC: <0.2 MG/DL (ref 0–1.2)
BUN BLDV-MCNC: 14 MG/DL (ref 6–20)
CALCIUM SERPL-MCNC: 8.6 MG/DL (ref 8.6–10.2)
CHLORIDE BLD-SCNC: 108 MMOL/L (ref 98–107)
CO2: 23 MMOL/L (ref 22–29)
CREAT SERPL-MCNC: 0.8 MG/DL (ref 0.7–1.2)
EKG ATRIAL RATE: 62 BPM
EKG ATRIAL RATE: 72 BPM
EKG P AXIS: 35 DEGREES
EKG P AXIS: 62 DEGREES
EKG P-R INTERVAL: 124 MS
EKG P-R INTERVAL: 128 MS
EKG Q-T INTERVAL: 378 MS
EKG Q-T INTERVAL: 436 MS
EKG QRS DURATION: 80 MS
EKG QRS DURATION: 80 MS
EKG QTC CALCULATION (BAZETT): 413 MS
EKG QTC CALCULATION (BAZETT): 442 MS
EKG R AXIS: -23 DEGREES
EKG R AXIS: -26 DEGREES
EKG T AXIS: -26 DEGREES
EKG T AXIS: -37 DEGREES
EKG VENTRICULAR RATE: 62 BPM
EKG VENTRICULAR RATE: 72 BPM
GFR AFRICAN AMERICAN: >60
GFR NON-AFRICAN AMERICAN: >60 ML/MIN/1.73
GLUCOSE BLD-MCNC: 102 MG/DL (ref 74–99)
LV EF: 58 %
LVEF MODALITY: NORMAL
POTASSIUM SERPL-SCNC: 3.6 MMOL/L (ref 3.5–5)
SODIUM BLD-SCNC: 141 MMOL/L (ref 132–146)
TOTAL PROTEIN: 6 G/DL (ref 6.4–8.3)

## 2022-09-18 PROCEDURE — 6370000000 HC RX 637 (ALT 250 FOR IP): Performed by: INTERNAL MEDICINE

## 2022-09-18 PROCEDURE — 85730 THROMBOPLASTIN TIME PARTIAL: CPT

## 2022-09-18 PROCEDURE — 36415 COLL VENOUS BLD VENIPUNCTURE: CPT

## 2022-09-18 PROCEDURE — 6370000000 HC RX 637 (ALT 250 FOR IP): Performed by: CLINICAL NURSE SPECIALIST

## 2022-09-18 PROCEDURE — 6370000000 HC RX 637 (ALT 250 FOR IP): Performed by: NURSE PRACTITIONER

## 2022-09-18 PROCEDURE — 99232 SBSQ HOSP IP/OBS MODERATE 35: CPT | Performed by: INTERNAL MEDICINE

## 2022-09-18 PROCEDURE — 93306 TTE W/DOPPLER COMPLETE: CPT

## 2022-09-18 PROCEDURE — 6360000002 HC RX W HCPCS: Performed by: NURSE PRACTITIONER

## 2022-09-18 PROCEDURE — 80053 COMPREHEN METABOLIC PANEL: CPT

## 2022-09-18 PROCEDURE — 2060000000 HC ICU INTERMEDIATE R&B

## 2022-09-18 RX ORDER — ROSUVASTATIN CALCIUM 20 MG/1
20 TABLET, COATED ORAL NIGHTLY
Qty: 30 TABLET | Refills: 3 | Status: SHIPPED | OUTPATIENT
Start: 2022-09-18

## 2022-09-18 RX ORDER — METOPROLOL SUCCINATE 25 MG/1
12.5 TABLET, EXTENDED RELEASE ORAL DAILY
Qty: 30 TABLET | Refills: 3 | Status: SHIPPED | OUTPATIENT
Start: 2022-09-19

## 2022-09-18 RX ORDER — METOPROLOL SUCCINATE 25 MG/1
12.5 TABLET, EXTENDED RELEASE ORAL DAILY
Status: DISCONTINUED | OUTPATIENT
Start: 2022-09-18 | End: 2022-09-19 | Stop reason: HOSPADM

## 2022-09-18 RX ORDER — NICOTINE 21 MG/24HR
1 PATCH, TRANSDERMAL 24 HOURS TRANSDERMAL DAILY
Qty: 30 PATCH | Refills: 3 | Status: SHIPPED | OUTPATIENT
Start: 2022-09-19

## 2022-09-18 RX ADMIN — METOPROLOL SUCCINATE 12.5 MG: 25 TABLET, EXTENDED RELEASE ORAL at 16:30

## 2022-09-18 RX ADMIN — CLOPIDOGREL BISULFATE 75 MG: 75 TABLET ORAL at 10:34

## 2022-09-18 RX ADMIN — ASPIRIN 81 MG CHEWABLE TABLET 81 MG: 81 TABLET CHEWABLE at 10:34

## 2022-09-18 RX ADMIN — Medication 16 UNITS/KG/HR: at 02:29

## 2022-09-18 RX ADMIN — BUPROPION HYDROCHLORIDE 150 MG: 150 TABLET, EXTENDED RELEASE ORAL at 10:34

## 2022-09-18 RX ADMIN — ROSUVASTATIN CALCIUM 20 MG: 20 TABLET, FILM COATED ORAL at 20:15

## 2022-09-18 NOTE — PROGRESS NOTES
[facility]  Internal medicine  Progress Notes         CHIEF COMPLAINT: Throat pain      HISTORY OF PRESENT ILLNESS:    9/17/2022  80-year-old  woman seen in the emergency room of [facility] earlier this morning  Admission details noted  His sister was at bedside  Patient admits to using cocaine last month and experienced chest discomfort associated with throat pain diaphoresis  He presented to [facility] at that time and was sent to [facility]  Apparently had a heart catheterization  He does not remember the results of it  He now presents with acute onset of throat pain again which occurred at rest  No recent use of cocaine  Recently started on Wellbutrin for tobacco cessation  Pain-free at this time  On IV heparin infusion  Troponins are elevated  9/18/2022  Patient was seen on the floor earlier this morning  Registered nurse at bedside  No further chest pain or throat pain  On IV heparin infusion  Echo pending    Past Medical History:    No past medical history on file. Ongoing tobacco use  Recent use of cocaine  Recent heart catheterization in [facility]  Past Surgical History:    No past surgical history on file. Medications Prior to Admission:    Aspirin  Plavix  Lipitor  Wellbutrin  Medications Prior to Admission: aspirin 81 MG chewable tablet, Take 81 mg by mouth daily  clopidogrel (PLAVIX) 75 MG tablet, Take 75 mg by mouth daily  atorvastatin (LIPITOR) 20 MG tablet, Take 20 mg by mouth daily  buPROPion (WELLBUTRIN XL) 150 MG extended release tablet, Take 150 mg by mouth every morning  ibuprofen (ADVIL;MOTRIN) 800 MG tablet, Take 1 tablet by mouth every 6 hours as needed for Pain for up to 5 days. traMADol (ULTRAM) 50 MG tablet, Take 1 tablet by mouth every 6 hours as needed for Pain for up to 10 doses. Allergies:    Patient has no known allergies. Social History:    reports that he has been smoking cigarettes. He has been smoking an average of 1 pack per day.  He does not have any smokeless tobacco history on file. He reports that he does not use drugs. Family History:   family history is not on file. REVIEW OF SYSTEMS:  As above in the HPI, otherwise negative    PHYSICAL EXAM:    Vitals:  /80   Pulse 70   Temp 98.7 °F (37.1 °C) (Oral)   Resp 14   Ht 5' 6\" (1.676 m)   Wt 130 lb (59 kg)   SpO2 97%   BMI 20.98 kg/m²     General:  Awake, alert, oriented X 3. Thin built  No acute distress  HEENT:  Normocephalic, atraumatic. Pupils equal, round, reactive to light. No scleral icterus. No conjunctival injection. No nasal discharge. Neck:  Supple  Heart:  RRR, no murmurs, gallops, rubs  Lungs:  CTA bilaterally, bilat symmetrical expansion, no wheeze, rales, or rhonchi  Abdomen:   Bowel sounds present, soft, nontender, no masses, no organomegaly, no peritoneal signs  Extremities:  No clubbing, cyanosis, or edema  Skin:  Warm and dry, no open lesions or rash  Neuro:  Cranial nerves 2-12 intact, no focal deficits  Breast: deferred  Rectal: deferred  Genitalia:  deferred    LABS:        ASSESSMENT:      Principal Problem:    NSTEMI (non-ST elevated myocardial infarction) ([facility] [address]  Prior use of cocaine  Ongoing tobacco use  Recent heart catheterization in [address]:  Likely home today depending on the echo findings  IV heparin infusion can be discontinued  Aspirin Plavix Lipitor  Cardiology consult/input appreciated  Questions answered to satisfaction  Risk modification strategies discussed   Tobacco cessation discussed  Questions answered to his satisfaction      Carolin Nettles MD  12:41 PM  9/18/2022

## 2022-09-18 NOTE — DISCHARGE INSTRUCTIONS
Your information:  Name: Soraida Andersen  : 1975    Your instructions:        What to do after you leave the hospital:    Recommended diet: regular diet    Recommended activity: activity as tolerated        The following personal items were collected during your admission and were returned to you:    Belongings  Dental Appliances: None  Vision - Corrective Lenses: None  Hearing Aid: None  Clothing: Socks, Undergarments, Shirt, Pants  Jewelry: None  Body Piercings Removed: N/A  Electronic Devices: At bedside, Cell Phone  Weapons (Notify Protective Services/Security): None  Other Valuables: Sent home  Home Medications: None  Valuables Given To: Family (Comment)  Patient approves for provider to speak to responsible person post operatively: No    Information obtained by:  By signing below, I understand that if any problems occur once I leave the hospital I am to contact my family doctor. I understand and acknowledge receipt of the instructions indicated above.

## 2022-09-18 NOTE — PLAN OF CARE
Problem: Discharge Planning  Goal: Discharge to home or other facility with appropriate resources  Outcome: Progressing  Flowsheets (Taken 9/17/2022 2135)  Discharge to home or other facility with appropriate resources: Identify barriers to discharge with patient and caregiver     Problem: Cardiovascular - Adult  Goal: Maintains optimal cardiac output and hemodynamic stability  Outcome: Progressing  Goal: Absence of cardiac dysrhythmias or at baseline  Outcome: Progressing     Problem: ABCDS Injury Assessment  Goal: Absence of physical injury  Outcome: Adequate for Discharge

## 2022-09-18 NOTE — PROGRESS NOTES
PRN Nikita Campbell, APRN - CNP        Or    acetaminophen (TYLENOL) suppository 650 mg  650 mg Rectal Q6H PRN Nikita Campbell, APRN - CNP        senna (SENOKOT) tablet 8.6 mg  1 tablet Oral Daily PRN Nikita Campbell, APRN - CNP        rosuvastatin (CRESTOR) tablet 20 mg  20 mg Oral Nightly Regroger Childressa, APRN - CNS   20 mg at 09/17/22 2117    perflutren lipid microspheres (DEFINITY) injection 1.65 mg  1.5 mL IntraVENous ONCE PRN Reggy Allegra, APRN - CNS        nicotine (NICODERM CQ) 14 MG/24HR 1 patch  1 patch TransDERmal Daily Ania Burns MD   1 patch at 09/18/22 1036    sodium chloride flush 0.9 % injection 5-40 mL  5-40 mL IntraVENous 2 times per day Ania Burns MD   10 mL at 09/17/22 2117    sodium chloride flush 0.9 % injection 5-40 mL  5-40 mL IntraVENous PRN Ania Burns MD        0.9 % sodium chloride infusion   IntraVENous PRN Ania Burns MD        LORazepam (ATIVAN) tablet 1 mg  1 mg Oral Q1H PRN Ania Burns MD        Or    LORazepam (ATIVAN) injection 1 mg  1 mg IntraVENous Q1H PRN Ania Burns MD        Or    LORazepam (ATIVAN) tablet 2 mg  2 mg Oral Q1H PRN Ania Burns MD        Or    LORazepam (ATIVAN) injection 2 mg  2 mg IntraVENous Q1H PRN Ania Burns MD        Or    LORazepam (ATIVAN) tablet 3 mg  3 mg Oral Q1H PRN Ania Burns MD        Or    LORazepam (ATIVAN) injection 3 mg  3 mg IntraVENous Q1H PRANNE Burns MD        Or    LORazepam (ATIVAN) tablet 4 mg  4 mg Oral Q1H PRN Ania Burns MD        Or    LORazepam (ATIVAN) injection 4 mg  4 mg IntraVENous Q1H PRN Ania Burns MD          sodium chloride      sodium chloride         Physical Exam:  /80   Pulse 70   Temp 98.7 °F (37.1 °C) (Oral)   Resp 14   Ht 5' 6\" (1.676 m)   Wt 130 lb (59 kg)   SpO2 97%   BMI 20.98 kg/m²   Wt Readings from Last 3 Encounters:   09/16/22 130 lb (59 kg)   09/10/20 120 lb (54.4 kg)     Appearance: Awake, alert, no acute respiratory distress  Skin: Intact, no Component Value Date    HDL 55 09/17/2022    HDL 62 09/12/2020     Lab Results   Component Value Date    LDLCALC 102 (H) 09/17/2022    LDLCALC 68 09/12/2020     Lab Results   Component Value Date    LABVLDL 22 09/17/2022    LABVLDL 23 09/12/2020     No results found for: CHOLHDLRATIO    Cardiac Tests:  Telemetry findings reviewed: SR at rate 60s, no arrhythmias overnight     EKG: Normal sinus rhythm, LVH, T wave changes suggestive of inferior wall ischemia     Vitals and labs were reviewed: Blood pressure 114/80 heart rate 70 sats 97 % on room air     Labs-BMP normal, troponin, high sensitivity 122>> 421, cholesterol 179, HDL 50, , triglyceride 110, AST/ALT 41/21, A1c 4.9, urine drug screen negative, CBC normal.     Chest x-ray NAD     Assessment:  Atypical chest pain with positive troponin and significant delta. Recent history of NSTEMI in the setting of cocaine toxicity, cardiac cath at [facility] in August 2022 was normal  History of polysubstance abuse quit in August 2022  Tobacco use disorder  History of alcohol abuse           Plan:   Continue aspirin, Plavix and IV heparin, heparin was discontinued this morning  Will change atorvastatin to rosuvastatin 20 mg p.o. daily  Medical records including cardiac cath results from [facility] are pending  Echocardiogram to assess LV function is pending  Add low-dose beta-blocker therapy with Toprol-XL 12.5 mg p.o. daily  Patient was advised again to stop smoking, drinking and also using any illicit drugs. No ischemic work-up is planned at this time and he can eat. If the echocardiogram shows normal LV function and he is stable for discharge from cardiology standpoint. Richard Hooks MD., Audi Cyr.   [facility]) Cardiology

## 2022-09-19 NOTE — PROGRESS NOTES
Patient D/C today around 20:30. Home meds and D/C informations reviewed with patient, and  questions and concerns addressed at the time of D/C patient's VS were all WNL. No S/S of any type of distress or discomfort noted or voiced by patient. Patient wheeled down to his ride at the main entrance.

## 2022-09-19 NOTE — DISCHARGE SUMMARY
Physician Discharge Summary     Patient ID:  Chante Gibson  [id number]  52 y.o.  1975    Admit date: 9/16/2022    Discharge date and time: 9/18/2022  9:30 PM     Admission Diagnoses: Non-STEMI (non-ST elevated myocardial infarction) ([facility] [address] [I21.4]  NSTEMI (non-ST elevated myocardial infarction) [facility]) [I21.4]    Discharge Diagnoses  Recent non-ST elevation myocardial infarction probably cocaine induced  Tobacco alcohol and substance use    Consults: Cardiology  Procedures: Echo    Hospital Course:  77-year-old  man with complex medical history  Claims that he was recently hospitalized at [facility] for cocaine induced chest pain and suffered from non-ST elevation myocardial infarction  Coronary angiography apparently did not reveal occlusive disease  He is readmitted here with atypical chest pain and throat pain  Troponins are still elevated  Seen by cardiology  Echocardiography revealed no wall motion abnormalities  He remained stable during the rest of the hospital course    Discharge Exam:  See progress note from today    Disposition: Stable at the time of discharge  Discharge to home    Patient Instructions:   Discharge Medication List as of 9/18/2022  7:16 PM        START taking these medications    Details   rosuvastatin (CRESTOR) 20 MG tablet Take 1 tablet by mouth nightly, Disp-30 tablet, R-3Normal      metoprolol succinate (TOPROL XL) 25 MG extended release tablet Take 0.5 tablets by mouth daily, Disp-30 tablet, R-3Normal      nicotine (NICODERM CQ) 14 MG/24HR Place 1 patch onto the skin daily, Disp-30 patch, R-3Normal           CONTINUE these medications which have NOT CHANGED    Details   aspirin 81 MG chewable tablet Take 81 mg by mouth dailyHistorical Med      clopidogrel (PLAVIX) 75 MG tablet Take 75 mg by mouth dailyHistorical Med      buPROPion (WELLBUTRIN XL) 150 MG extended release tablet Take 150 mg by mouth every morningHistorical Med      ibuprofen (ADVIL;MOTRIN) 800 MG tablet Take 1